# Patient Record
Sex: MALE | Race: BLACK OR AFRICAN AMERICAN | NOT HISPANIC OR LATINO | Employment: UNEMPLOYED | URBAN - METROPOLITAN AREA
[De-identification: names, ages, dates, MRNs, and addresses within clinical notes are randomized per-mention and may not be internally consistent; named-entity substitution may affect disease eponyms.]

---

## 2023-10-28 ENCOUNTER — APPOINTMENT (EMERGENCY)
Dept: RADIOLOGY | Facility: HOSPITAL | Age: 53
End: 2023-10-28

## 2023-10-28 ENCOUNTER — HOSPITAL ENCOUNTER (EMERGENCY)
Facility: HOSPITAL | Age: 53
Discharge: HOME | End: 2023-10-28
Attending: STUDENT IN AN ORGANIZED HEALTH CARE EDUCATION/TRAINING PROGRAM

## 2023-10-28 VITALS
DIASTOLIC BLOOD PRESSURE: 74 MMHG | RESPIRATION RATE: 20 BRPM | OXYGEN SATURATION: 99 % | TEMPERATURE: 97.5 F | SYSTOLIC BLOOD PRESSURE: 134 MMHG | WEIGHT: 167 LBS | HEART RATE: 91 BPM

## 2023-10-28 DIAGNOSIS — F10.939 ALCOHOL WITHDRAWAL (HCC): Primary | ICD-10-CM

## 2023-10-28 DIAGNOSIS — F19.90 MISUSE OF DRUGS: ICD-10-CM

## 2023-10-28 LAB
ALBUMIN SERPL BCP-MCNC: 4.2 G/DL (ref 3.5–5)
ALP SERPL-CCNC: 43 U/L (ref 34–104)
ALT SERPL W P-5'-P-CCNC: 21 U/L (ref 7–52)
AMPHETAMINES SERPL QL SCN: NEGATIVE
ANION GAP SERPL CALCULATED.3IONS-SCNC: 11 MMOL/L
APAP SERPL-MCNC: <10 UG/ML (ref 10–20)
AST SERPL W P-5'-P-CCNC: 30 U/L (ref 13–39)
BARBITURATES UR QL: NEGATIVE
BASOPHILS # BLD AUTO: 0.01 THOUSANDS/ÂΜL (ref 0–0.1)
BASOPHILS NFR BLD AUTO: 0 % (ref 0–1)
BENZODIAZ UR QL: NEGATIVE
BILIRUB SERPL-MCNC: 0.95 MG/DL (ref 0.2–1)
BUN SERPL-MCNC: 21 MG/DL (ref 5–25)
CALCIUM SERPL-MCNC: 9.4 MG/DL (ref 8.4–10.2)
CARDIAC TROPONIN I PNL SERPL HS: 11 NG/L
CHLORIDE SERPL-SCNC: 99 MMOL/L (ref 96–108)
CO2 SERPL-SCNC: 24 MMOL/L (ref 21–32)
COCAINE UR QL: NEGATIVE
CREAT SERPL-MCNC: 1.06 MG/DL (ref 0.6–1.3)
EOSINOPHIL # BLD AUTO: 0.04 THOUSAND/ÂΜL (ref 0–0.61)
EOSINOPHIL NFR BLD AUTO: 0 % (ref 0–6)
ERYTHROCYTE [DISTWIDTH] IN BLOOD BY AUTOMATED COUNT: 12.4 % (ref 11.6–15.1)
ETHANOL SERPL-MCNC: <10 MG/DL
FLUAV RNA RESP QL NAA+PROBE: NEGATIVE
FLUBV RNA RESP QL NAA+PROBE: NEGATIVE
GFR SERPL CREATININE-BSD FRML MDRD: 79 ML/MIN/1.73SQ M
GLUCOSE SERPL-MCNC: 126 MG/DL (ref 65–140)
HCT VFR BLD AUTO: 46 % (ref 36.5–49.3)
HGB BLD-MCNC: 15.7 G/DL (ref 12–17)
IMM GRANULOCYTES # BLD AUTO: 0.02 THOUSAND/UL (ref 0–0.2)
IMM GRANULOCYTES NFR BLD AUTO: 0 % (ref 0–2)
LIPASE SERPL-CCNC: 12 U/L (ref 11–82)
LYMPHOCYTES # BLD AUTO: 1.96 THOUSANDS/ÂΜL (ref 0.6–4.47)
LYMPHOCYTES NFR BLD AUTO: 22 % (ref 14–44)
MCH RBC QN AUTO: 30.7 PG (ref 26.8–34.3)
MCHC RBC AUTO-ENTMCNC: 34.1 G/DL (ref 31.4–37.4)
MCV RBC AUTO: 90 FL (ref 82–98)
METHADONE UR QL: NEGATIVE
MONOCYTES # BLD AUTO: 0.69 THOUSAND/ÂΜL (ref 0.17–1.22)
MONOCYTES NFR BLD AUTO: 8 % (ref 4–12)
NEUTROPHILS # BLD AUTO: 6.26 THOUSANDS/ÂΜL (ref 1.85–7.62)
NEUTS SEG NFR BLD AUTO: 70 % (ref 43–75)
NRBC BLD AUTO-RTO: 0 /100 WBCS
OPIATES UR QL SCN: NEGATIVE
OXYCODONE+OXYMORPHONE UR QL SCN: NEGATIVE
PCP UR QL: NEGATIVE
PLATELET # BLD AUTO: 185 THOUSANDS/UL (ref 149–390)
PMV BLD AUTO: 9.7 FL (ref 8.9–12.7)
POTASSIUM SERPL-SCNC: 4.3 MMOL/L (ref 3.5–5.3)
PROT SERPL-MCNC: 7.9 G/DL (ref 6.4–8.4)
RBC # BLD AUTO: 5.12 MILLION/UL (ref 3.88–5.62)
RSV RNA RESP QL NAA+PROBE: NEGATIVE
SALICYLATES SERPL-MCNC: <5 MG/DL (ref 3–20)
SARS-COV-2 RNA RESP QL NAA+PROBE: NEGATIVE
SODIUM SERPL-SCNC: 134 MMOL/L (ref 135–147)
THC UR QL: NEGATIVE
WBC # BLD AUTO: 8.98 THOUSAND/UL (ref 4.31–10.16)

## 2023-10-28 PROCEDURE — 82077 ASSAY SPEC XCP UR&BREATH IA: CPT | Performed by: STUDENT IN AN ORGANIZED HEALTH CARE EDUCATION/TRAINING PROGRAM

## 2023-10-28 PROCEDURE — 83690 ASSAY OF LIPASE: CPT | Performed by: STUDENT IN AN ORGANIZED HEALTH CARE EDUCATION/TRAINING PROGRAM

## 2023-10-28 PROCEDURE — 85025 COMPLETE CBC W/AUTO DIFF WBC: CPT | Performed by: STUDENT IN AN ORGANIZED HEALTH CARE EDUCATION/TRAINING PROGRAM

## 2023-10-28 PROCEDURE — 36415 COLL VENOUS BLD VENIPUNCTURE: CPT | Performed by: STUDENT IN AN ORGANIZED HEALTH CARE EDUCATION/TRAINING PROGRAM

## 2023-10-28 PROCEDURE — 84484 ASSAY OF TROPONIN QUANT: CPT | Performed by: STUDENT IN AN ORGANIZED HEALTH CARE EDUCATION/TRAINING PROGRAM

## 2023-10-28 PROCEDURE — 71045 X-RAY EXAM CHEST 1 VIEW: CPT

## 2023-10-28 PROCEDURE — 80307 DRUG TEST PRSMV CHEM ANLYZR: CPT | Performed by: STUDENT IN AN ORGANIZED HEALTH CARE EDUCATION/TRAINING PROGRAM

## 2023-10-28 PROCEDURE — 96374 THER/PROPH/DIAG INJ IV PUSH: CPT

## 2023-10-28 PROCEDURE — 99285 EMERGENCY DEPT VISIT HI MDM: CPT | Performed by: STUDENT IN AN ORGANIZED HEALTH CARE EDUCATION/TRAINING PROGRAM

## 2023-10-28 PROCEDURE — 80143 DRUG ASSAY ACETAMINOPHEN: CPT | Performed by: STUDENT IN AN ORGANIZED HEALTH CARE EDUCATION/TRAINING PROGRAM

## 2023-10-28 PROCEDURE — 99285 EMERGENCY DEPT VISIT HI MDM: CPT

## 2023-10-28 PROCEDURE — 80053 COMPREHEN METABOLIC PANEL: CPT | Performed by: STUDENT IN AN ORGANIZED HEALTH CARE EDUCATION/TRAINING PROGRAM

## 2023-10-28 PROCEDURE — 0241U HB NFCT DS VIR RESP RNA 4 TRGT: CPT | Performed by: STUDENT IN AN ORGANIZED HEALTH CARE EDUCATION/TRAINING PROGRAM

## 2023-10-28 PROCEDURE — 80179 DRUG ASSAY SALICYLATE: CPT | Performed by: STUDENT IN AN ORGANIZED HEALTH CARE EDUCATION/TRAINING PROGRAM

## 2023-10-28 PROCEDURE — 96375 TX/PRO/DX INJ NEW DRUG ADDON: CPT

## 2023-10-28 PROCEDURE — 93005 ELECTROCARDIOGRAM TRACING: CPT

## 2023-10-28 RX ORDER — LORAZEPAM 2 MG/ML
2 INJECTION INTRAMUSCULAR ONCE
Status: COMPLETED | OUTPATIENT
Start: 2023-10-28 | End: 2023-10-28

## 2023-10-28 RX ORDER — ACETAMINOPHEN 10 MG/ML
1000 INJECTION, SOLUTION INTRAVENOUS ONCE
Status: COMPLETED | OUTPATIENT
Start: 2023-10-28 | End: 2023-10-28

## 2023-10-28 RX ADMIN — LORAZEPAM 2 MG: 2 INJECTION INTRAMUSCULAR; INTRAVENOUS at 11:52

## 2023-10-28 RX ADMIN — ACETAMINOPHEN 1000 MG: 10 INJECTION INTRAVENOUS at 12:16

## 2023-10-28 NOTE — ED PROVIDER NOTES
History  Chief Complaint   Patient presents with    Withdrawal - Alcohol     Pt presents in wheelchair stating "I'm hurting all over because I stopped drinking and doing drugs 3 days ago. Pt reports drinking a pint of vodka and using heroine everyday."     HPI    Patient is a 59-year-old male present emerged department for withdrawal symptoms. Patient admits to last consuming alcohol about 2 days ago. Patient states he drinks about a pint of vodka a day. Patient also admits to using heroin and cocaine daily. Last used 3 days ago. Patient states his whole body hurts he feels uncomfortable. Admits to some chest discomfort without shortness of breath or difficulty breathing. Patient states that he does not want to use drugs anymore and is seeking detox. Denies any other drug use. Denies any other medical conditions. No recent surgeries. None       History reviewed. No pertinent past medical history. History reviewed. No pertinent surgical history. History reviewed. No pertinent family history. I have reviewed and agree with the history as documented. E-Cigarette/Vaping     E-Cigarette/Vaping Substances     Social History     Tobacco Use    Smoking status: Every Day     Packs/day: 0.25     Types: Cigarettes    Smokeless tobacco: Never   Substance Use Topics    Alcohol use: Not Currently     Comment: pint of vodka    Drug use: Not Currently     Types: Heroin       Review of Systems   Constitutional:  Positive for fatigue. Negative for chills and fever. HENT:  Negative for ear pain and sore throat. Eyes:  Negative for pain and visual disturbance. Respiratory:  Negative for cough and shortness of breath. Cardiovascular:  Positive for chest pain. Negative for palpitations. Gastrointestinal:  Negative for abdominal pain and vomiting. Genitourinary:  Negative for dysuria and hematuria. Musculoskeletal:  Negative for arthralgias and back pain. Skin:  Negative for color change and rash. Neurological:  Positive for weakness. Negative for seizures and syncope. Psychiatric/Behavioral:  The patient is nervous/anxious. All other systems reviewed and are negative. Physical Exam  Physical Exam  Vitals and nursing note reviewed. Constitutional:       General: He is not in acute distress. Appearance: He is well-developed. HENT:      Head: Normocephalic and atraumatic. Right Ear: Tympanic membrane and ear canal normal.      Left Ear: Tympanic membrane and ear canal normal.      Nose: Nose normal.      Mouth/Throat:      Mouth: Mucous membranes are moist.      Pharynx: Oropharynx is clear. Eyes:      Extraocular Movements: Extraocular movements intact. Conjunctiva/sclera: Conjunctivae normal.      Pupils: Pupils are equal, round, and reactive to light. Cardiovascular:      Rate and Rhythm: Regular rhythm. Tachycardia present. Heart sounds: No murmur heard. Pulmonary:      Effort: Pulmonary effort is normal. No respiratory distress. Breath sounds: Normal breath sounds. Abdominal:      Palpations: Abdomen is soft. Tenderness: There is no abdominal tenderness. Musculoskeletal:         General: No swelling. Cervical back: Neck supple. Skin:     General: Skin is warm and dry. Capillary Refill: Capillary refill takes less than 2 seconds. Neurological:      General: No focal deficit present. Mental Status: He is alert and oriented to person, place, and time.    Psychiatric:         Mood and Affect: Mood normal.         Vital Signs  ED Triage Vitals [10/28/23 1053]   Temperature Pulse Respirations Blood Pressure SpO2   97.5 °F (36.4 °C) 101 20 160/80 99 %      Temp Source Heart Rate Source Patient Position - Orthostatic VS BP Location FiO2 (%)   Temporal Monitor Sitting Left arm --      Pain Score       --           Vitals:    10/28/23 1500 10/28/23 1530 10/28/23 1600 10/28/23 1630   BP: 155/88 156/88 153/77 134/74   Pulse: 91 89 92 91   Patient Position - Orthostatic VS:             Visual Acuity      ED Medications  Medications   LORazepam (ATIVAN) injection 2 mg (2 mg Intravenous Given 10/28/23 1152)   acetaminophen (Ofirmev) injection 1,000 mg (0 mg Intravenous Stopped 10/28/23 1231)       Diagnostic Studies  Results Reviewed       Procedure Component Value Units Date/Time    Rapid drug screen, urine [[id number]]  (Normal) Collected: 10/28/23 1534    Lab Status: Final result Specimen: Urine, Clean Catch Updated: 10/28/23 1559     Amph/Meth UR Negative     Barbiturate Ur Negative     Benzodiazepine Urine Negative     Cocaine Urine Negative     Methadone Urine Negative     Opiate Urine Negative     PCP Ur Negative     THC Urine Negative     Oxycodone Urine Negative    Narrative:      FOR MEDICAL PURPOSES ONLY. IF CONFIRMATION NEEDED PLEASE CONTACT THE LAB WITHIN 5 DAYS. Drug Screen Cutoff Levels:  AMPHETAMINE/METHAMPHETAMINES  1000 ng/mL  BARBITURATES     200 ng/mL  BENZODIAZEPINES     200 ng/mL  COCAINE      300 ng/mL  METHADONE      300 ng/mL  OPIATES      300 ng/mL  PHENCYCLIDINE     25 ng/mL  THC       50 ng/mL  OXYCODONE      100 ng/mL    UA w Reflex to Microscopic w Reflex to Culture [[id number]] Collected: 10/28/23 1534    Lab Status: No result Specimen: Urine, Clean Catch     FLU/RSV/COVID - if FLU/RSV clinically relevant [[id number]]  (Normal) Collected: 10/28/23 1110    Lab Status: Final result Specimen: Nares from Nose Updated: 10/28/23 1208     SARS-CoV-2 Negative     INFLUENZA A PCR Negative     INFLUENZA B PCR Negative     RSV PCR Negative    Narrative:      FOR PEDIATRIC PATIENTS - copy/paste COVID Guidelines URL to browser: [email or web address]    SARS-CoV-2 assay is a Nucleic Acid Amplification assay intended for the  qualitative detection of nucleic acid from SARS-CoV-2 in nasopharyngeal  swabs. Results are for the presumptive identification of SARS-CoV-2 RNA.     Positive results are indicative of infection with SARS-CoV-2, the virus  causing COVID-19, but do not rule out bacterial infection or co-infection  with other viruses. Laboratories within the [facility] and its  territories are required to report all positive results to the appropriate  public health authorities. Negative results do not preclude SARS-CoV-2  infection and should not be used as the sole basis for treatment or other  patient management decisions. Negative results must be combined with  clinical observations, patient history, and epidemiological information. This test has not been FDA cleared or approved. This test has been authorized by FDA under an Emergency Use Authorization  (EUA). This test is only authorized for the duration of time the  declaration that circumstances exist justifying the authorization of the  emergency use of an in vitro diagnostic tests for detection of SARS-CoV-2  virus and/or diagnosis of COVID-19 infection under section 564(b)(1) of  the Act, 21 U. S.C. 786UQS-3(Y)(0), unless the authorization is terminated  or revoked sooner. The test has been validated but independent review by FDA  and CLIA is pending. Test performed using [employer] Gene[employer]pert: This RT-PCR assay targets N2,  a region unique to SARS-CoV-2. A conserved region in the E-gene was chosen  for pan-Sarbecovirus detection which includes SARS-CoV-2. According to CMS-2020-01-R, this platform meets the definition of high-throughput technology.     HS Troponin 0hr (reflex protocol) [[id number]]  (Normal) Collected: 10/28/23 1110    Lab Status: Final result Specimen: Blood from Arm, Left Updated: 10/28/23 1144     hs TnI 0hr 11 ng/L     Comprehensive metabolic panel [[id number]]  (Abnormal) Collected: 10/28/23 1110    Lab Status: Final result Specimen: Blood from Arm, Left Updated: 10/28/23 1142     Sodium 134 mmol/L      Potassium 4.3 mmol/L      Chloride 99 mmol/L      CO2 24 mmol/L      ANION GAP 11 mmol/L      BUN 21 mg/dL Creatinine 1.06 mg/dL      Glucose 126 mg/dL      Calcium 9.4 mg/dL      AST 30 U/L      ALT 21 U/L      Alkaline Phosphatase 43 U/L      Total Protein 7.9 g/dL      Albumin 4.2 g/dL      Total Bilirubin 0.95 mg/dL      eGFR 79 ml/min/1.73sq m     Narrative:      [facility] guidelines for Chronic Kidney Disease (CKD):     Stage 1 with normal or high GFR (GFR > 90 mL/min/1.73 square meters)    Stage 2 Mild CKD (GFR = 60-89 mL/min/1.73 square meters)    Stage 3A Moderate CKD (GFR = 45-59 mL/min/1.73 square meters)    Stage 3B Moderate CKD (GFR = 30-44 mL/min/1.73 square meters)    Stage 4 Severe CKD (GFR = 15-29 mL/min/1.73 square meters)    Stage 5 End Stage CKD (GFR <15 mL/min/1.73 square meters)  Note: GFR calculation is accurate only with a steady state creatinine    Lipase [[id number]]  (Normal) Collected: 10/28/23 1110    Lab Status: Final result Specimen: Blood from Arm, Left Updated: 10/28/23 1142     Lipase 12 u/L     Acetaminophen level-"If concentration is detectable, please discuss with medical  on call." [[id number]]  (Abnormal) Collected: 10/28/23 1110    Lab Status: Final result Specimen: Blood from Arm, Left Updated: 10/28/23 1142     Acetaminophen Level <77 ug/mL     Salicylate level [[id number]]  (Normal) Collected: 10/28/23 1110    Lab Status: Final result Specimen: Blood from Arm, Left Updated: 44/53/89 8655     Salicylate Lvl <5 mg/dL     Ethanol [[id number]]  (Normal) Collected: 10/28/23 1110    Lab Status: Final result Specimen: Blood from Arm, Left Updated: 10/28/23 1137     Ethanol Lvl <10 mg/dL     CBC and differential [[id number]] Collected: 10/28/23 1110    Lab Status: Final result Specimen: Blood from Arm, Left Updated: 10/28/23 1120     WBC 8.98 Thousand/uL      RBC 5.12 Million/uL      Hemoglobin 15.7 g/dL      Hematocrit 46.0 %      MCV 90 fL      MCH 30.7 pg      MCHC 34.1 g/dL      RDW 12.4 %      MPV 9.7 fL      Platelets 299 Thousands/uL      nRBC 0 /100 WBCs      Neutrophils Relative 70 %      Immat GRANS % 0 %      Lymphocytes Relative 22 %      Monocytes Relative 8 %      Eosinophils Relative 0 %      Basophils Relative 0 %      Neutrophils Absolute 6.26 Thousands/µL      Immature Grans Absolute 0.02 Thousand/uL      Lymphocytes Absolute 1.96 Thousands/µL      Monocytes Absolute 0.69 Thousand/µL      Eosinophils Absolute 0.04 Thousand/µL      Basophils Absolute 0.01 Thousands/µL                    XR chest 1 view portable    (Results Pending)              Procedures  Procedures         ED Course                               SBIRT 20yo+      Flowsheet Row Most Recent Value   Initial Alcohol Screen: US AUDIT-C     1. How often do you have a drink containing alcohol? 6 Filed at: 10/28/2023 1055   2. How many drinks containing alcohol do you have on a typical day you are drinking? 6 Filed at: 10/28/2023 1055   3a. Male UNDER 65: How often do you have five or more drinks on one occasion? 6 Filed at: 10/28/2023 1055   Audit-C Score 18 Filed at: 10/28/2023 1055   Full Alcohol Screen: US AUDIT    4. How often during the last year have you found that you were not able to stop drinking once you had started? 4 Filed at: 10/28/2023 1053   5. How often during past year have you failed to do what was normally expected of you because of drinking? 4 Filed at: 10/28/2023 1053   6. How often in past year have you needed a first drink in the morning to get yourself going after a heavy drinking session? 4 Filed at: 10/28/2023 1053   7. How often in past year have you had feeling of guilt or remorse after drinking? 4 Filed at: 10/28/2023 1053   8. How often in past year have you been unable to remember what happened night before because you had been drinking? 4 Filed at: 10/28/2023 1053   9. Have you or someone else been injured as a result of your drinking? 4 Filed at: 10/28/2023 1053   10.  Has a relative, friend, doctor or other health worker been concerned about your drinking and suggested you cut down? 4 Filed at: 10/28/2023 1053   AUDIT Total Score 46 Filed at: 10/28/2023 1053   GREY: How many times in the past year have you. .. Used an illegal drug or used a prescription medication for non-medical reasons? Daily or Almost Daily Filed at: 10/28/2023 1055   DAST-10: In the past 12 months. ..    1. Have you used drugs other than those required for medical reasons? 1 Filed at: 10/28/2023 1053   2. Do you use more than one drug at a time? 1 Filed at: 10/28/2023 1053   3. Have you had medical problems as a result of your drug use (e.g., memory loss, hepatitis, convulsions, bleeding, etc.)? 1 Filed at: 10/28/2023 1053   4. Have you had "blackouts" or "flashbacks" as a result of drug use? YesNo 1 Filed at: 10/28/2023 1053   5. Do you ever feel bad or guilty about your drug use? 1 Filed at: 10/28/2023 1053   6. Does your spouse (or parent) ever complain about your involvement with drugs? 1 Filed at: 10/28/2023 1053   7. Have you neglected your family because of your use of drugs? 1 Filed at: 10/28/2023 1053   8. Have you engaged in illegal activities in order to obtain drugs? 1 Filed at: 10/28/2023 1053   9. Have you ever experienced withdrawal symptoms (felt sick) when you stopped taking drugs? 1 Filed at: 10/28/2023 1053   10. Are you always able to stop using drugs when you want to? 1 Filed at: 10/28/2023 1053   DAST-10 Score 10 Filed at: 10/28/2023 1053                      Medical Decision Making  Differential: arrhythmia, dehydration, viral illness, alcohol withdrawal, drug missuse    Patient is a 41-year-old male present emerged department no acute respiratory distress and vital signs show tachycardia. Patient does appear anxious during initial examination. It also appears to be comfortable at bedside. CBC and CMP unremarkable. Troponin negative. Salicylate and Tylenol unremarkable. Rapid drug screen is negative. Patient is negative for flu COVID and RSV.   Alcohol is undetectable. Patient was given fluids, Tylenol and 2 of Ativan. Overall patient had improvement of symptoms. Patient was observed. Reached out to the detox team who suggested the patient will be optimal for warm handoff. A rehab facility reached out to patient for possible placement. Pending further discussion with this facility. If unable to place today patient will be provided with resources in the warm handoff for future placement. Patient remains hemodynamically stable and is comfortable at bedside. Discussed symptomatic management as well as return follow-up precautions. Amount and/or Complexity of Data Reviewed  Labs: ordered. Radiology: ordered. Risk  Prescription drug management. Disposition  Final diagnoses:   Alcohol withdrawal ([address])   Misuse of drugs     Time reflects when diagnosis was documented in both MDM as applicable and the Disposition within this note       Time User Action Codes Description Comment    10/28/2023  2:31 PM Bita RICHARDS Add [F10.939] Alcohol withdrawal ([address])     10/28/2023  2:31 PM Vickie Sierra Add [F19.90] Misuse of drugs           ED Disposition       ED Disposition   Discharge    Condition   Stable    Date/Time   Sat Oct 28, 2023 1431    [facility] discharge to home/self care. Follow-up Information    None         Patient's Medications    No medications on file       No discharge procedures on file.     PDMP Review       None            ED Provider  Electronically Signed by

## 2023-10-28 NOTE — QUICK NOTE
MEDICAL DETOX UNIT, LEVEL 4  Department of Medical Toxicology    Principal Problem: opioid and alcohol withdrawal    Assessing Provider: Gabriel Marquez PA-C    10/28/23    ASSESSMENT:    Sonia Topete is a 46 y.o. male who presents to [address] [place] [address] ED for evaluation of heroin and alcohol withdrawal. Per discussion with ER provider Dr. Scot Alex, patient uses heroin daily (last use 3 days ago) and consumes alcohol daily (last use 2 days ago). Has received 2 mg lorazepam thus far during ED evaluation. Vitals:    10/28/23 1300   BP: 136/79   Pulse: 92   Resp: 20   Temp:    SpO2: 100%       Recent Labs     10/28/23  1110   WBC 8.98   HGB 15.7   HCT 46.0      SODIUM 134*   K 4.3   CO2 24   AST 30   ALT 21   ETOH <10      RECOMMENDATIONS:    Vitals and labs look overall stable. With his last reported use of heroin/alcohol being 2-3 days ago and current stability, patient would be more appropriate for warm handoff for level 3.7 detox. We recommend that the appropriate warm handoff be placed at this time unless patient has another acute medical need that warrants an inpatient admission. In terms of opioid use, if patient is interested in long-term MAT such as suboxone, he could be started on that in the ED. If patient is agreeable to Suboxone, could administered 4 mg test dose; if tolerated after 60 minutes, can administer additional 8 mg with plan to transition to maintenance regimen of Suboxone 8 mg BID. If patient elects to pursue this option, will need to ensure he has MAT follow-up in place prior to discharge. Please contact network detox attending or AP on-call for further assistance if needed.

## 2023-10-28 NOTE — DISCHARGE INSTRUCTIONS
[facility]    Follow-up with resources given. Utilize the [facility]. [address], [address]    Contact: [phone]  Call them to establish a ride  time. Return if you have any new or worsening symptoms such as chest pain, shortness of breath or difficulty breathing.

## 2023-10-28 NOTE — ED CARE HANDOFF
[facility] Warm Handoff Outcome Note    Patient name Álvarez Sheets  Location ED 08/ED 08 MRN [id number]  Age: 46 y.o. Plan Type:   Warm Handoff                                                                                    Plan Date: 10/28/2023  Service:  ED Warm Handoff      Substance Use History:  Alochol and opiates    Warm Handoff Update:  Pt was given tx resources, d/c to home    Warm Handoff Outcome: Treatment Related Resources

## 2023-10-29 LAB
ATRIAL RATE: 87 BPM
P AXIS: 69 DEGREES
PR INTERVAL: 134 MS
QRS AXIS: 90 DEGREES
QRSD INTERVAL: 82 MS
QT INTERVAL: 352 MS
QTC INTERVAL: 423 MS
T WAVE AXIS: 38 DEGREES
VENTRICULAR RATE: 87 BPM

## 2023-10-29 PROCEDURE — 93010 ELECTROCARDIOGRAM REPORT: CPT | Performed by: INTERNAL MEDICINE

## 2025-06-02 ENCOUNTER — VBI (OUTPATIENT)
Dept: ADMINISTRATIVE | Facility: OTHER | Age: 55
End: 2025-06-02

## 2025-06-02 NOTE — TELEPHONE ENCOUNTER
06/02/25 6:59 AM     Chart reviewed for CRC: Colonoscopy ; nothing is submitted to the patient's insurance at this time.     Linnea Muir   [facility]